# Patient Record
Sex: FEMALE | Race: BLACK OR AFRICAN AMERICAN | URBAN - METROPOLITAN AREA
[De-identification: names, ages, dates, MRNs, and addresses within clinical notes are randomized per-mention and may not be internally consistent; named-entity substitution may affect disease eponyms.]

---

## 2018-05-15 ENCOUNTER — APPOINTMENT (OUTPATIENT)
Dept: WOUND CARE | Age: 58
End: 2018-05-15

## 2018-05-16 ENCOUNTER — HOSPITAL ENCOUNTER (OUTPATIENT)
Dept: WOUND CARE | Age: 58
Discharge: HOME | End: 2018-05-16

## 2018-05-16 VITALS
HEIGHT: 66 IN | BODY MASS INDEX: 47.09 KG/M2 | TEMPERATURE: 97.7 F | OXYGEN SATURATION: 100 % | HEART RATE: 90 BPM | DIASTOLIC BLOOD PRESSURE: 78 MMHG | SYSTOLIC BLOOD PRESSURE: 177 MMHG | RESPIRATION RATE: 19 BRPM | WEIGHT: 293 LBS

## 2018-05-16 PROCEDURE — 99201 HC NEW PT LEVEL I: CPT

## 2018-05-16 RX ORDER — METFORMIN HYDROCHLORIDE 500 MG/1
500 TABLET ORAL 2 TIMES DAILY WITH MEALS
COMMUNITY

## 2018-05-16 RX ORDER — GLIPIZIDE 10 MG/1
10 TABLET ORAL 2 TIMES DAILY
COMMUNITY

## 2018-05-16 RX ORDER — CARVEDILOL 6.25 MG/1
6.25 TABLET ORAL 2 TIMES DAILY WITH MEALS
COMMUNITY

## 2018-05-16 RX ORDER — ASPIRIN 325 MG
325 TABLET ORAL DAILY
COMMUNITY

## 2018-05-16 RX ORDER — LOSARTAN POTASSIUM 50 MG/1
50 TABLET ORAL DAILY
COMMUNITY

## 2018-05-16 RX ORDER — GABAPENTIN 300 MG/1
300 CAPSULE ORAL 3 TIMES DAILY
COMMUNITY

## 2018-05-16 RX ORDER — CEPHALEXIN 500 MG/1
500 CAPSULE ORAL 4 TIMES DAILY
COMMUNITY

## 2018-05-16 RX ORDER — FUROSEMIDE 20 MG/1
20 TABLET ORAL DAILY
COMMUNITY

## 2018-05-23 ENCOUNTER — APPOINTMENT (OUTPATIENT)
Dept: WOUND CARE | Age: 58
End: 2018-05-23

## 2018-08-23 ENCOUNTER — HOSPITAL ENCOUNTER (OUTPATIENT)
Dept: WOUND CARE | Age: 58
Discharge: HOME | End: 2018-08-23
Payer: SELF-PAY

## 2018-08-23 PROCEDURE — 99201 HC NEW PT LEVEL I: CPT

## 2018-08-23 NOTE — DISCHARGE INSTRUCTIONS
Patient has been discharge per Dr. Lesley Swanson, wound is clinically closed information was given oral to contact the community clinic for foot and nail care.

## 2019-01-31 PROBLEM — L97.519 DIABETIC ULCER OF TOE OF RIGHT FOOT ASSOCIATED WITH TYPE 2 DIABETES MELLITUS (HCC): Status: ACTIVE | Noted: 2019-01-31

## 2019-01-31 PROBLEM — E11.621 DIABETIC ULCER OF TOE OF RIGHT FOOT ASSOCIATED WITH TYPE 2 DIABETES MELLITUS (HCC): Status: ACTIVE | Noted: 2019-01-31

## 2019-01-31 NOTE — WOUND CARE
[facility] Center[facility] Consult [facility] Chief Complaint: 
Chandler Castaneda is a 66 y.o.  female who presents with right great toe diabetic foot ulcer present since July 2016. HPI:   She has diabetic foot ulcer on and off since July 2016, it has effected her right great toe. She was seen by podiatrist. Plants surface of her right great toe was debrided. She was placed on course of antibiotic. Her wound is now clinically closed, she does have some callus which I have advised to follow up with podiatry Wound caused by: diabetes Current wound care: local wound care Offloading wound: n/a Appetite: good Wound associated pain: none Diabetic: yes Past Medical History:  
Diagnosis Date  Arrhythmia   
 afib  Diabetes ([facility]  Hypertension  Ill-defined condition  Neuropathy associated with endocrine disorder ([facility]  PAF (paroxysmal atrial fibrillation) ([facility] Past Surgical History:  
Procedure Laterality Date  HX HYSTERECTOMY History reviewed. No pertinent family history. Social History Tobacco Use  Smoking status: Not on file Substance Use Topics  Alcohol use: Not on file Prior to Admission medications Medication Sig Start Date End Date Taking? Authorizing Provider  
carvedilol (COREG) 6.25 mg tablet Take 6.25 mg by mouth two (2) times daily (with meals). Yes Provider, Historical  
furosemide (LASIX) 20 mg tablet Take 20 mg by mouth daily. Yes Provider, Historical  
gabapentin (NEURONTIN) 300 mg capsule Take 300 mg by mouth three (3) times daily. Yes Provider, Historical  
glipiZIDE (GLUCOTROL) 10 mg tablet Take 10 mg by mouth two (2) times a day. Yes Provider, Historical  
aspirin (ASPIRIN) 325 mg tablet Take 325 mg by mouth daily. Yes Provider, Historical  
cephALEXin (KEFLEX) 500 mg capsule Take 500 mg by mouth four (4) times daily.    Yes Provider, Historical  
 losartan (COZAAR) 50 mg tablet Take 50 mg by mouth daily. Yes Provider, Historical  
metFORMIN (GLUCOPHAGE) 500 mg tablet Take 500 mg by mouth two (2) times daily (with meals). Yes Provider, Historical  
 
Allergies Allergen Reactions  Ciprofloxacin Other (comments) GI intolerance Review of Systems Gen: No fever, chills, malaise, weight loss/gain. Heent: No headache, rhinorrhea, epistaxis, ear pain, hearing loss, sinus pain, neck pain/stiffness, sore throat. Heart: No chest pain, palpitations, PEDERSON, pnd, or orthopnea. Resp: No cough, hemoptysis, wheezing and shortness of breath. GI: No nausea, vomiting, diarrhea, constipation, melena or hematochezia. : No urinary obstruction, dysuria or hematuria. Derm: see above Musc/skeletal: no bone or joint complains. Vasc: No edema, cyanosis or claudication. Endo: No heat/cold intolerance, no polyuria,polydipsia or polyphagia. Neuro: No unilateral weakness, numbness, tingling. No seizures. Heme: No easy bruising or bleeding. Objective:  
 
Physical Exam:  
 
Vitals taken not able to pull up on chart. General: well developed, well nourished, pleasant , NAD. Hygiene good Psych: cooperative. Pleasant. No anxiety or depression. Normal mood and affect. Neuro: alert and oriented to person/place/situation. Otherwise nonfocal. 
Derm: Skin turgor for age, dry skin HEENT: Normocephalic, atraumatic. EOMI. Conjunctiva clear. No scleral icterus. Neck: Normal range of motion. No masses. Chest: Good air entry bilaterally. Respirations nonlabored Cardio[de-identified] Normal heart sounds,no rubs, murmurs or gallops Abdomen: Soft, nontender, nondistended, normoactive bowel sounds Lower extremities: color normal; temperature normal. Calves are supple, nontender. Capillary refill <3 sec Right great toe wound clinically closed, has some callus, Wound Description: Wound Length:   
 
Wound Width :  
 
Wound Depth :  
 
Etiology: diabetic Ulcer bed: healed Periwound: Calloused Exudate: None: wound tissue dry Data Review: No results found for this or any previous visit (from the past 24 hour(s)). Assessment:  
 
Patient Active Problem List  
Diagnosis Code  Diabetic ulcer of toe of right foot associated with type 2 diabetes mellitus [facility] E11.621, L97.519  
 
66 y.o. female with right great toe diabetic foot ulcer Needs : 
Good Diabetic control Plan:  
 
Follow up with podiatry Signed By: Rosalind Castaneda MD   
 January 31, 2019